# Patient Record
Sex: FEMALE | Race: WHITE | NOT HISPANIC OR LATINO | Employment: FULL TIME | URBAN - NONMETROPOLITAN AREA
[De-identification: names, ages, dates, MRNs, and addresses within clinical notes are randomized per-mention and may not be internally consistent; named-entity substitution may affect disease eponyms.]

---

## 2023-06-28 PROBLEM — M25.531 RIGHT WRIST PAIN: Status: ACTIVE | Noted: 2023-06-28

## 2023-06-29 ENCOUNTER — TELEPHONE (OUTPATIENT)
Dept: PRIMARY CARE | Facility: CLINIC | Age: 63
End: 2023-06-29

## 2023-06-29 ENCOUNTER — OFFICE VISIT (OUTPATIENT)
Dept: PRIMARY CARE | Facility: CLINIC | Age: 63
End: 2023-06-29
Payer: COMMERCIAL

## 2023-06-29 VITALS
SYSTOLIC BLOOD PRESSURE: 116 MMHG | HEART RATE: 74 BPM | HEIGHT: 61 IN | WEIGHT: 140.9 LBS | OXYGEN SATURATION: 98 % | DIASTOLIC BLOOD PRESSURE: 62 MMHG | BODY MASS INDEX: 26.6 KG/M2

## 2023-06-29 DIAGNOSIS — M79.604 LEG PAIN, BILATERAL: Primary | ICD-10-CM

## 2023-06-29 DIAGNOSIS — I73.9 CLAUDICATION (CMS-HCC): ICD-10-CM

## 2023-06-29 DIAGNOSIS — R21 SKIN RASH: ICD-10-CM

## 2023-06-29 DIAGNOSIS — R60.0 EDEMA OF BOTH LOWER LEGS: ICD-10-CM

## 2023-06-29 DIAGNOSIS — M79.605 LEG PAIN, BILATERAL: Primary | ICD-10-CM

## 2023-06-29 PROCEDURE — 99214 OFFICE O/P EST MOD 30 MIN: CPT | Performed by: FAMILY MEDICINE

## 2023-06-29 PROCEDURE — 1036F TOBACCO NON-USER: CPT | Performed by: FAMILY MEDICINE

## 2023-06-29 RX ORDER — MELOXICAM 7.5 MG/1
7.5 TABLET ORAL DAILY
Qty: 30 TABLET | Refills: 11 | Status: SHIPPED | OUTPATIENT
Start: 2023-06-29 | End: 2023-08-04 | Stop reason: SDUPTHER

## 2023-06-29 RX ORDER — EPINEPHRINE 0.3 MG/.3ML
INJECTION SUBCUTANEOUS
COMMUNITY

## 2023-06-29 ASSESSMENT — ENCOUNTER SYMPTOMS
NAIL CHANGES: 0
ARTHRALGIAS: 1
FEVER: 0
PALPITATIONS: 0
RHINORRHEA: 0
DIARRHEA: 0
FATIGUE: 0
ANOREXIA: 0
COUGH: 0
MYALGIAS: 1
SORE THROAT: 0
SHORTNESS OF BREATH: 0
EYE PAIN: 0
VOMITING: 0

## 2023-06-29 ASSESSMENT — PATIENT HEALTH QUESTIONNAIRE - PHQ9
SUM OF ALL RESPONSES TO PHQ9 QUESTIONS 1 AND 2: 0
1. LITTLE INTEREST OR PLEASURE IN DOING THINGS: NOT AT ALL
2. FEELING DOWN, DEPRESSED OR HOPELESS: NOT AT ALL

## 2023-06-29 NOTE — PROGRESS NOTES
Subjective   Patient ID: Bel Adair is a 63 y.o. female who presents for Rash (Legs x4mon).    Rash  This is a recurrent problem. The current episode started more than 1 month ago. The problem has been gradually worsening since onset. The affected locations include the left leg, left ankle, left foot, left toes, right leg, right ankle, right foot and right toes. The rash is characterized by pain, redness, swelling and itchiness. She was exposed to nothing. Associated symptoms include joint pain. Pertinent negatives include no anorexia, congestion, cough, diarrhea, eye pain, facial edema, fatigue, fever, nail changes, rhinorrhea, shortness of breath, sore throat or vomiting.      We had talked in the past about some hand issues could it be rheumatism.  No other issues but now she started to have a little bit of swelling is very minimal but aches and pains in the skin with a little change to like a stasis dermatitis rash on the lower legs.  She is appearing to also have some claudication-like pain when she walks.  She does have some back issues but no obvious neurogenic claudication.    When she uses things like ibuprofen and Naprosyn it does help with the joint pain but not with the soft tissue/phlebitis leg pain.  No extreme fatigue  Basic labs including thyroid and CBC were okay in February.    ?  Claudication.  Ultrasound of the arteries with TRE  Stop Naprosyn and try meloxicam daily  Office visit in a month    Does she need a rheumatism work-up, prednisone trial?  Review of Systems   Constitutional:  Negative for fatigue and fever.   HENT:  Negative for congestion, rhinorrhea and sore throat.    Eyes:  Negative for pain.   Respiratory:  Negative for cough and shortness of breath.    Cardiovascular:  Positive for leg swelling. Negative for chest pain and palpitations.   Gastrointestinal:  Negative for anorexia, diarrhea and vomiting.   Musculoskeletal:  Positive for arthralgias, joint pain and myalgias.  "Negative for gait problem.   Skin:  Positive for rash. Negative for nail changes.       Objective   /62   Pulse 74   Ht 1.549 m (5' 1\")   Wt 63.9 kg (140 lb 14.4 oz)   SpO2 98%   BMI 26.62 kg/m²     Physical Exam  Constitutional:       Appearance: Normal appearance.   HENT:      Head: Normocephalic and atraumatic.   Cardiovascular:      Rate and Rhythm: Normal rate and regular rhythm.      Heart sounds: Normal heart sounds.   Pulmonary:      Effort: Pulmonary effort is normal.      Breath sounds: Normal breath sounds.   Skin:     General: Skin is warm and dry.   Neurological:      General: No focal deficit present.      Mental Status: She is alert and oriented to person, place, and time.      Gait: Gait normal.   Psychiatric:         Mood and Affect: Mood normal.         Behavior: Behavior normal.         Thought Content: Thought content normal.         Judgment: Judgment normal.     Could not feel dorsalis pedis or posterior tibial pulses in either leg.    Assessment/Plan   Problem List Items Addressed This Visit       Leg pain, bilateral - Primary    Relevant Medications    meloxicam (Mobic) 7.5 mg tablet    Other Relevant Orders    Vascular US lower extremity arterial duplex bilateral with TRE    Edema of both lower legs    Relevant Orders    Vascular US lower extremity arterial duplex bilateral with TRE    Skin rash    Relevant Orders    Vascular US lower extremity arterial duplex bilateral with TRE     Other Visit Diagnoses       Claudication (CMS/HCC)        Relevant Orders    Vascular US lower extremity arterial duplex bilateral with TRE               "

## 2023-08-04 ENCOUNTER — LAB (OUTPATIENT)
Dept: LAB | Facility: LAB | Age: 63
End: 2023-08-04
Payer: COMMERCIAL

## 2023-08-04 ENCOUNTER — OFFICE VISIT (OUTPATIENT)
Dept: PRIMARY CARE | Facility: CLINIC | Age: 63
End: 2023-08-04
Payer: COMMERCIAL

## 2023-08-04 VITALS
HEART RATE: 52 BPM | OXYGEN SATURATION: 97 % | BODY MASS INDEX: 26.6 KG/M2 | DIASTOLIC BLOOD PRESSURE: 58 MMHG | HEIGHT: 61 IN | WEIGHT: 140.9 LBS | SYSTOLIC BLOOD PRESSURE: 126 MMHG

## 2023-08-04 DIAGNOSIS — M79.604 LEG PAIN, BILATERAL: ICD-10-CM

## 2023-08-04 DIAGNOSIS — M79.605 LEG PAIN, BILATERAL: Primary | ICD-10-CM

## 2023-08-04 DIAGNOSIS — M79.605 LEG PAIN, BILATERAL: ICD-10-CM

## 2023-08-04 DIAGNOSIS — R53.83 FATIGUE, UNSPECIFIED TYPE: ICD-10-CM

## 2023-08-04 DIAGNOSIS — M25.531 RIGHT WRIST PAIN: ICD-10-CM

## 2023-08-04 DIAGNOSIS — M79.604 LEG PAIN, BILATERAL: Primary | ICD-10-CM

## 2023-08-04 LAB
C REACTIVE PROTEIN (MG/L) IN SER/PLAS: 0.3 MG/DL
ERYTHROCYTE DISTRIBUTION WIDTH (RATIO) BY AUTOMATED COUNT: 12.9 % (ref 11.5–14.5)
ERYTHROCYTE MEAN CORPUSCULAR HEMOGLOBIN CONCENTRATION (G/DL) BY AUTOMATED: 32.8 G/DL (ref 32–36)
ERYTHROCYTE MEAN CORPUSCULAR VOLUME (FL) BY AUTOMATED COUNT: 94 FL (ref 80–100)
ERYTHROCYTES (10*6/UL) IN BLOOD BY AUTOMATED COUNT: 4.18 X10E12/L (ref 4–5.2)
HEMATOCRIT (%) IN BLOOD BY AUTOMATED COUNT: 39.3 % (ref 36–46)
HEMOGLOBIN (G/DL) IN BLOOD: 12.9 G/DL (ref 12–16)
LEUKOCYTES (10*3/UL) IN BLOOD BY AUTOMATED COUNT: 7.8 X10E9/L (ref 4.4–11.3)
PLATELETS (10*3/UL) IN BLOOD AUTOMATED COUNT: 186 X10E9/L (ref 150–450)
SEDIMENTATION RATE, ERYTHROCYTE: 8 MM/H (ref 0–30)

## 2023-08-04 PROCEDURE — 86140 C-REACTIVE PROTEIN: CPT

## 2023-08-04 PROCEDURE — 99214 OFFICE O/P EST MOD 30 MIN: CPT | Performed by: FAMILY MEDICINE

## 2023-08-04 PROCEDURE — 86038 ANTINUCLEAR ANTIBODIES: CPT

## 2023-08-04 PROCEDURE — 1036F TOBACCO NON-USER: CPT | Performed by: FAMILY MEDICINE

## 2023-08-04 PROCEDURE — 86431 RHEUMATOID FACTOR QUANT: CPT

## 2023-08-04 PROCEDURE — 85652 RBC SED RATE AUTOMATED: CPT

## 2023-08-04 PROCEDURE — 86200 CCP ANTIBODY: CPT

## 2023-08-04 PROCEDURE — 36415 COLL VENOUS BLD VENIPUNCTURE: CPT

## 2023-08-04 PROCEDURE — 85027 COMPLETE CBC AUTOMATED: CPT

## 2023-08-04 RX ORDER — PREDNISONE 10 MG/1
TABLET ORAL
Qty: 30 TABLET | Refills: 1 | Status: SHIPPED | OUTPATIENT
Start: 2023-08-04 | End: 2023-08-16

## 2023-08-04 RX ORDER — MELOXICAM 15 MG/1
15 TABLET ORAL DAILY
Qty: 30 TABLET | Refills: 11 | Status: SHIPPED | OUTPATIENT
Start: 2023-08-04 | End: 2024-08-03

## 2023-08-04 ASSESSMENT — PATIENT HEALTH QUESTIONNAIRE - PHQ9
SUM OF ALL RESPONSES TO PHQ9 QUESTIONS 1 AND 2: 0
2. FEELING DOWN, DEPRESSED OR HOPELESS: NOT AT ALL
1. LITTLE INTEREST OR PLEASURE IN DOING THINGS: NOT AT ALL

## 2023-08-04 ASSESSMENT — ENCOUNTER SYMPTOMS
FATIGUE: 1
DIARRHEA: 0
HEADACHES: 0
PALPITATIONS: 0
NUMBNESS: 1
CONSTIPATION: 0
ARTHRALGIAS: 1
ABDOMINAL PAIN: 0
CHEST TIGHTNESS: 0
COUGH: 0
SHORTNESS OF BREATH: 0
WEAKNESS: 1
MYALGIAS: 1

## 2023-08-04 NOTE — PROGRESS NOTES
"Subjective   Patient ID: Bel Adair is a 63 y.o. female who presents for Follow-up.    HPI   The PVR test for circulation came back normal for both legs.  The hand pain knee pain and leg pain and foot pain did get better with the meloxicam.  But when she tried to do a lot of walking had a trip recently she felt like she had weakness more than she had in the legs and the leg pain got much worse.  At this point I am suspecting autoimmune.    Lupus rheumatism work-up for labs  Increase meloxicam to 15 mg daily  Prednisone taper    No office visit set yet.  Likely start with rheumatology referral.?  Neurology referral.  Review of Systems   Constitutional:  Positive for fatigue.   Eyes:  Negative for visual disturbance.   Respiratory:  Negative for cough, chest tightness and shortness of breath.    Cardiovascular:  Negative for chest pain and palpitations.   Gastrointestinal:  Negative for abdominal pain, constipation and diarrhea.   Musculoskeletal:  Positive for arthralgias and myalgias.   Skin:  Negative for rash.   Neurological:  Positive for weakness and numbness. Negative for headaches.       Objective   /58   Pulse 52   Ht 1.549 m (5' 1\")   Wt 63.9 kg (140 lb 14.4 oz)   SpO2 97%   BMI 26.62 kg/m²     Physical Exam  Constitutional:       Appearance: Normal appearance.   HENT:      Head: Normocephalic and atraumatic.   Skin:     General: Skin is warm and dry.   Neurological:      General: No focal deficit present.      Mental Status: She is alert and oriented to person, place, and time.      Gait: Gait normal.   Psychiatric:         Mood and Affect: Mood normal.         Behavior: Behavior normal.         Thought Content: Thought content normal.         Judgment: Judgment normal.         Assessment/Plan   Problem List Items Addressed This Visit       Right wrist pain    Relevant Orders    CBC    Comprehensive Metabolic Panel    Rheumatoid Factor    Citrulline Antibody, IgG    Sedimentation Rate    " C-Reactive Protein    REBECCA with Reflex to RUBEN    Leg pain, bilateral - Primary    Relevant Medications    predniSONE (Deltasone) 10 mg tablet    meloxicam (Mobic) 15 mg tablet    Other Relevant Orders    CBC    Comprehensive Metabolic Panel    Rheumatoid Factor    Citrulline Antibody, IgG    Sedimentation Rate    C-Reactive Protein    REBECCA with Reflex to RUBEN     Other Visit Diagnoses       Fatigue, unspecified type        Relevant Orders    CBC    Comprehensive Metabolic Panel    Rheumatoid Factor    Citrulline Antibody, IgG    Sedimentation Rate    C-Reactive Protein    REBECCA with Reflex to RUBEN

## 2023-08-05 LAB
CITRULLINE ANTIBODY, IGG: <1 U/ML
RHEUMATOID FACTOR (IU/ML) IN SERUM OR PLASMA: <10 IU/ML (ref 0–15)

## 2023-08-07 LAB — ANTI-NUCLEAR ANTIBODY (ANA): NEGATIVE

## 2023-11-21 ENCOUNTER — APPOINTMENT (OUTPATIENT)
Dept: URBAN - METROPOLITAN AREA CLINIC 204 | Age: 63
Setting detail: DERMATOLOGY
End: 2023-11-22

## 2023-11-21 PROCEDURE — 99203 OFFICE O/P NEW LOW 30 MIN: CPT | Mod: 25

## 2023-11-21 PROCEDURE — OTHER MIPS QUALITY: OTHER

## 2023-11-21 PROCEDURE — 17000 DESTRUCT PREMALG LESION: CPT | Mod: 59

## 2023-11-21 PROCEDURE — 17003 DESTRUCT PREMALG LES 2-14: CPT | Mod: 59

## 2023-11-21 PROCEDURE — 17110 DESTRUCT B9 LESION 1-14: CPT

## 2024-08-27 DIAGNOSIS — M19.90 ARTHRITIS: ICD-10-CM

## 2024-08-27 RX ORDER — MELOXICAM 15 MG/1
15 TABLET ORAL DAILY
Qty: 30 TABLET | Refills: 11 | Status: SHIPPED | OUTPATIENT
Start: 2024-08-27 | End: 2025-08-27

## 2024-09-10 ENCOUNTER — APPOINTMENT (OUTPATIENT)
Age: 64
End: 2024-09-10
Payer: COMMERCIAL

## 2024-09-10 VITALS
HEIGHT: 61 IN | BODY MASS INDEX: 27.45 KG/M2 | SYSTOLIC BLOOD PRESSURE: 116 MMHG | OXYGEN SATURATION: 98 % | DIASTOLIC BLOOD PRESSURE: 72 MMHG | WEIGHT: 145.4 LBS | HEART RATE: 61 BPM

## 2024-09-10 DIAGNOSIS — R53.83 FATIGUE, UNSPECIFIED TYPE: ICD-10-CM

## 2024-09-10 DIAGNOSIS — Z00.00 ROUTINE GENERAL MEDICAL EXAMINATION AT A HEALTH CARE FACILITY: Primary | ICD-10-CM

## 2024-09-10 PROBLEM — R60.0 EDEMA OF BOTH LOWER LEGS: Status: RESOLVED | Noted: 2023-06-29 | Resolved: 2024-09-10

## 2024-09-10 PROBLEM — M25.531 RIGHT WRIST PAIN: Status: RESOLVED | Noted: 2023-06-28 | Resolved: 2024-09-10

## 2024-09-10 PROBLEM — G25.81 RESTLESS LEGS SYNDROME: Status: ACTIVE | Noted: 2024-09-10

## 2024-09-10 PROCEDURE — 99396 PREV VISIT EST AGE 40-64: CPT | Performed by: FAMILY MEDICINE

## 2024-09-10 PROCEDURE — 3008F BODY MASS INDEX DOCD: CPT | Performed by: FAMILY MEDICINE

## 2024-09-10 PROCEDURE — 1036F TOBACCO NON-USER: CPT | Performed by: FAMILY MEDICINE

## 2024-09-10 RX ORDER — AZELASTINE 1 MG/ML
1 SPRAY, METERED NASAL 2 TIMES DAILY
COMMUNITY

## 2024-09-10 ASSESSMENT — ENCOUNTER SYMPTOMS
ARTHRALGIAS: 1
BLOOD IN STOOL: 0
SHORTNESS OF BREATH: 0
PALPITATIONS: 0
DIARRHEA: 0
COUGH: 0
CONSTIPATION: 0
FATIGUE: 1

## 2024-09-10 NOTE — PROGRESS NOTES
"Subjective   Patient ID: Bel Adair is a 64 y.o. female who presents for Med Refill.    [facility]   Cologuard 2023  She did get benefit with both the skin and the joint pains with the prednisone.  For the most part the meloxicam is helping control the joint pain.  She is getting a couple of slightly raised lesions that are a little bit tender 1 in the hands and some on the legs.  She is also been seeing the eye doctor for sicca syndrome dry eyes dry mouth.  Rheumatism test last year were negative but I still think something autoimmune is going on.  Possible early psoriatic arthritis  She is also been having some of the reflux issue and occasional chest pain at rest but sounds like esophageal.  She has had no change in her exercise tolerance today but pulling down a porch moving things without any chest pains chest pressures.  She has been walking she has had increased exercise tolerance throughout the summer.  All the chest pains have occurred at rest.    Also she feels like her restless legs is getting worse not on any medications for that.    I think she needs to see rheumatology and dermatology.  Also some chronic pain from frostbite.?  Dermatology versus rheumatology    Recommend mammogram    Start with blood test test this week.  Office visit 1 month  Review of Systems   Constitutional:  Positive for fatigue.   Eyes:  Negative for visual disturbance.   Respiratory:  Negative for cough and shortness of breath.    Cardiovascular:  Negative for chest pain and palpitations.   Gastrointestinal:  Negative for blood in stool, constipation and diarrhea.   Musculoskeletal:  Positive for arthralgias.   Skin:  Positive for rash.       Objective   /72   Pulse 61   Ht 1.549 m (5' 1\")   Wt 66 kg (145 lb 6.4 oz)   SpO2 98%   BMI 27.47 kg/m²     Physical Exam  Constitutional:       Appearance: Normal appearance.   HENT:      Head: Normocephalic and atraumatic.   Cardiovascular:      Rate and Rhythm: Normal rate and " regular rhythm.      Heart sounds: Normal heart sounds.   Pulmonary:      Effort: Pulmonary effort is normal.      Breath sounds: Normal breath sounds.   Skin:     General: Skin is warm and dry.   Neurological:      General: No focal deficit present.      Mental Status: She is alert and oriented to person, place, and time.   Psychiatric:         Mood and Affect: Mood normal.         Behavior: Behavior normal.         Thought Content: Thought content normal.         Judgment: Judgment normal.         Assessment/Plan   Problem List Items Addressed This Visit    None  Visit Diagnoses         Codes    Routine general medical examination at a health care facility    -  Primary Z00.00    Relevant Orders    CBC    Comprehensive Metabolic Panel    Lipid Panel    Thyroid Stimulating Hormone    Magnesium    Vitamin B12    Fatigue, unspecified type     R53.83    Relevant Orders    CBC

## 2024-09-11 ENCOUNTER — LAB (OUTPATIENT)
Facility: LAB | Age: 64
End: 2024-09-11
Payer: COMMERCIAL

## 2024-09-11 DIAGNOSIS — Z00.00 ROUTINE GENERAL MEDICAL EXAMINATION AT A HEALTH CARE FACILITY: ICD-10-CM

## 2024-09-11 DIAGNOSIS — R53.83 FATIGUE, UNSPECIFIED TYPE: ICD-10-CM

## 2024-09-11 LAB
ALBUMIN SERPL BCP-MCNC: 4.3 G/DL (ref 3.4–5)
ALP SERPL-CCNC: 57 U/L (ref 33–136)
ALT SERPL W P-5'-P-CCNC: 18 U/L (ref 7–45)
ANION GAP SERPL CALC-SCNC: 11 MMOL/L (ref 10–20)
AST SERPL W P-5'-P-CCNC: 19 U/L (ref 9–39)
BILIRUB SERPL-MCNC: 0.4 MG/DL (ref 0–1.2)
BUN SERPL-MCNC: 18 MG/DL (ref 6–23)
CALCIUM SERPL-MCNC: 9.3 MG/DL (ref 8.6–10.3)
CHLORIDE SERPL-SCNC: 105 MMOL/L (ref 98–107)
CHOLEST SERPL-MCNC: 247 MG/DL (ref 0–199)
CHOLESTEROL/HDL RATIO: 4.8
CO2 SERPL-SCNC: 30 MMOL/L (ref 21–32)
CREAT SERPL-MCNC: 0.81 MG/DL (ref 0.5–1.05)
EGFRCR SERPLBLD CKD-EPI 2021: 82 ML/MIN/1.73M*2
ERYTHROCYTE [DISTWIDTH] IN BLOOD BY AUTOMATED COUNT: 12.7 % (ref 11.5–14.5)
GLUCOSE SERPL-MCNC: 95 MG/DL (ref 74–99)
HCT VFR BLD AUTO: 37.9 % (ref 36–46)
HDLC SERPL-MCNC: 52 MG/DL
HGB BLD-MCNC: 12.4 G/DL (ref 12–16)
LDLC SERPL CALC-MCNC: 169 MG/DL
MAGNESIUM SERPL-MCNC: 2.23 MG/DL (ref 1.6–2.4)
MCH RBC QN AUTO: 31.1 PG (ref 26–34)
MCHC RBC AUTO-ENTMCNC: 32.7 G/DL (ref 32–36)
MCV RBC AUTO: 95 FL (ref 80–100)
NON HDL CHOLESTEROL: 195 MG/DL (ref 0–149)
NRBC BLD-RTO: 0 /100 WBCS (ref 0–0)
PLATELET # BLD AUTO: 184 X10*3/UL (ref 150–450)
POTASSIUM SERPL-SCNC: 4.5 MMOL/L (ref 3.5–5.3)
PROT SERPL-MCNC: 6.5 G/DL (ref 6.4–8.2)
RBC # BLD AUTO: 3.99 X10*6/UL (ref 4–5.2)
SODIUM SERPL-SCNC: 141 MMOL/L (ref 136–145)
TRIGL SERPL-MCNC: 131 MG/DL (ref 0–149)
TSH SERPL-ACNC: 1.79 MIU/L (ref 0.44–3.98)
VIT B12 SERPL-MCNC: 359 PG/ML (ref 211–911)
VLDL: 26 MG/DL (ref 0–40)
WBC # BLD AUTO: 5.3 X10*3/UL (ref 4.4–11.3)

## 2024-09-11 PROCEDURE — 36415 COLL VENOUS BLD VENIPUNCTURE: CPT

## 2024-10-08 ENCOUNTER — APPOINTMENT (OUTPATIENT)
Age: 64
End: 2024-10-08
Payer: COMMERCIAL

## 2024-10-08 VITALS
WEIGHT: 146.4 LBS | HEART RATE: 66 BPM | DIASTOLIC BLOOD PRESSURE: 82 MMHG | SYSTOLIC BLOOD PRESSURE: 122 MMHG | BODY MASS INDEX: 27.64 KG/M2 | HEIGHT: 61 IN | OXYGEN SATURATION: 99 %

## 2024-10-08 DIAGNOSIS — R21 SKIN RASH: Primary | ICD-10-CM

## 2024-10-08 PROCEDURE — 1036F TOBACCO NON-USER: CPT | Performed by: FAMILY MEDICINE

## 2024-10-08 PROCEDURE — 3008F BODY MASS INDEX DOCD: CPT | Performed by: FAMILY MEDICINE

## 2024-10-08 PROCEDURE — 99213 OFFICE O/P EST LOW 20 MIN: CPT | Performed by: FAMILY MEDICINE

## 2024-10-08 RX ORDER — PREDNISONE 10 MG/1
TABLET ORAL
Qty: 30 TABLET | Refills: 2 | Status: SHIPPED | OUTPATIENT
Start: 2024-10-08 | End: 2024-10-20

## 2024-10-08 RX ORDER — TRIAMCINOLONE ACETONIDE 5 MG/G
CREAM TOPICAL 2 TIMES DAILY PRN
Qty: 30 G | Refills: 1 | Status: SHIPPED | OUTPATIENT
Start: 2024-10-08

## 2024-10-08 ASSESSMENT — ENCOUNTER SYMPTOMS
ARTHRALGIAS: 1
MYALGIAS: 1

## 2024-10-08 ASSESSMENT — PATIENT HEALTH QUESTIONNAIRE - PHQ9
2. FEELING DOWN, DEPRESSED OR HOPELESS: NOT AT ALL
SUM OF ALL RESPONSES TO PHQ9 QUESTIONS 1 AND 2: 0
1. LITTLE INTEREST OR PLEASURE IN DOING THINGS: NOT AT ALL

## 2024-10-08 NOTE — PROGRESS NOTES
"Subjective   Patient ID: Bel Adair is a 64 y.o. female who presents for Follow-up (1 mo fu for pain ).    HPI   Also comments that she might of had some frostbite in the past.  Does have troubles during the wintertime with sounds like peripheral neuropathy.  But it is mild.  Still has the restless legs, it comes and goes.  Has been using castor oil from a [employer] store for her skin.  Rheumatology labs from last year were okay.  Basic labs this summer were good also.   Will use some trying assistance and alone cream as needed.  Still only use it once a day most of the time a little bit goes a long way.  Also have a backup prednisone prescription.  Please she will need to do a 4321 taper.  She can take it a few days here there can figure out the dose  Continue meloxicam    In the end I think she probably does need to see rheumatology but with her situation she is going to try to see if she can wait till 67.  Point things are mild and I think that is reasonable.    Review of Systems   Musculoskeletal:  Positive for arthralgias and myalgias.   Skin:  Positive for rash.       Objective   /82   Pulse 66   Ht 1.549 m (5' 1\")   Wt 66.4 kg (146 lb 6.4 oz)   SpO2 99%   BMI 27.66 kg/m²     Physical Exam  Constitutional:       Appearance: Normal appearance.   HENT:      Head: Normocephalic and atraumatic.   Skin:     General: Skin is warm and dry.   Neurological:      General: No focal deficit present.      Mental Status: She is alert and oriented to person, place, and time.   Psychiatric:         Mood and Affect: Mood normal.         Behavior: Behavior normal.         Thought Content: Thought content normal.         Judgment: Judgment normal.         Assessment/Plan   Problem List Items Addressed This Visit             ICD-10-CM    Skin rash - Primary R21    Relevant Medications    triamcinolone (Kenalog) 0.5 % cream    predniSONE (Deltasone) 10 mg tablet          "

## 2025-04-08 ENCOUNTER — APPOINTMENT (OUTPATIENT)
Age: 65
End: 2025-04-08
Payer: COMMERCIAL

## 2025-07-15 ENCOUNTER — APPOINTMENT (OUTPATIENT)
Age: 65
End: 2025-07-15
Payer: COMMERCIAL

## 2025-07-22 ENCOUNTER — TELEPHONE (OUTPATIENT)
Age: 65
End: 2025-07-22

## 2025-07-22 ENCOUNTER — APPOINTMENT (OUTPATIENT)
Age: 65
End: 2025-07-22
Payer: COMMERCIAL

## 2025-07-22 VITALS
HEIGHT: 61 IN | HEART RATE: 62 BPM | SYSTOLIC BLOOD PRESSURE: 120 MMHG | WEIGHT: 148.4 LBS | OXYGEN SATURATION: 100 % | BODY MASS INDEX: 28.02 KG/M2 | DIASTOLIC BLOOD PRESSURE: 72 MMHG

## 2025-07-22 DIAGNOSIS — M79.605 LEG PAIN, BILATERAL: Primary | ICD-10-CM

## 2025-07-22 DIAGNOSIS — R20.0 NUMBNESS AND TINGLING: ICD-10-CM

## 2025-07-22 DIAGNOSIS — Z13.220 LIPID SCREENING: ICD-10-CM

## 2025-07-22 DIAGNOSIS — R53.83 FATIGUE, UNSPECIFIED TYPE: ICD-10-CM

## 2025-07-22 DIAGNOSIS — M79.604 LEG PAIN, BILATERAL: Primary | ICD-10-CM

## 2025-07-22 DIAGNOSIS — G25.81 RESTLESS LEGS SYNDROME: ICD-10-CM

## 2025-07-22 DIAGNOSIS — R20.2 NUMBNESS AND TINGLING: ICD-10-CM

## 2025-07-22 PROCEDURE — 3008F BODY MASS INDEX DOCD: CPT | Performed by: FAMILY MEDICINE

## 2025-07-22 PROCEDURE — 99214 OFFICE O/P EST MOD 30 MIN: CPT | Performed by: FAMILY MEDICINE

## 2025-07-22 PROCEDURE — 1036F TOBACCO NON-USER: CPT | Performed by: FAMILY MEDICINE

## 2025-07-22 RX ORDER — MELOXICAM 15 MG/1
15 TABLET ORAL DAILY
Qty: 30 TABLET | Refills: 11 | Status: CANCELLED | OUTPATIENT
Start: 2025-07-22 | End: 2026-07-22

## 2025-07-22 RX ORDER — CELECOXIB 200 MG/1
200 CAPSULE ORAL 2 TIMES DAILY
Qty: 60 CAPSULE | Refills: 11 | Status: SHIPPED | OUTPATIENT
Start: 2025-07-22 | End: 2026-07-22

## 2025-07-22 ASSESSMENT — ENCOUNTER SYMPTOMS
SHORTNESS OF BREATH: 0
CONSTIPATION: 0
LOSS OF SENSATION IN FEET: 0
ARTHRALGIAS: 1
DEPRESSION: 0
LIGHT-HEADEDNESS: 0
MYALGIAS: 1
FATIGUE: 1
DIARRHEA: 0
OCCASIONAL FEELINGS OF UNSTEADINESS: 0
PALPITATIONS: 0
HEADACHES: 0

## 2025-07-22 NOTE — PROGRESS NOTES
"Subjective   Patient ID: Bel Adair is a 65 y.o. female who presents for Follow-up (Med rf on meloxicam).    HPI   Regular follow-up for the aches and pains in the restless legs.  He notices she has a lot of refined sugar that definitely makes sleep worse with the restless legs.  Things are improved with magnesium taking roughly 200 mg per.  Does take a multivitamin but just periodically.  No big changes when she uses it overnight.  Does seem to have troubles if she takes too much zinc.  Also still has the numbness.  Is only taking very low doses of B12.  And a B complex.    Increase the magnesium, likely magnesium oxide to 40 mg daily  Start B12 over-the-counter 1000 mcg 1 pill daily  Periodic MVI.  She is also wondering if she can get more meloxicam.  Will stop meloxicam and try Celebrex.  Start once a day with the option of going to twice a day.  She did not get relief from ibuprofen or Aleve.?  Relafen if needed    Will recheck labs today  Also wants to do recheck of lipids.    Review of Systems   Constitutional:  Positive for fatigue.   Respiratory:  Negative for shortness of breath.    Cardiovascular:  Negative for palpitations.   Gastrointestinal:  Negative for constipation and diarrhea.   Endocrine: Negative for cold intolerance and heat intolerance.   Musculoskeletal:  Positive for arthralgias and myalgias.   Neurological:  Negative for light-headedness and headaches.       Objective   /72   Pulse 62   Ht (!) 1.549 m (5' 1\")   Wt 67.3 kg (148 lb 6.4 oz)   SpO2 100%   BMI 28.04 kg/m²     Physical Exam  Constitutional:       Appearance: Normal appearance.   HENT:      Head: Normocephalic and atraumatic.     Cardiovascular:      Rate and Rhythm: Normal rate and regular rhythm.      Heart sounds: Normal heart sounds.   Pulmonary:      Effort: Pulmonary effort is normal.      Breath sounds: Normal breath sounds.     Skin:     General: Skin is warm and dry.     Neurological:      General: No focal " deficit present.      Mental Status: She is alert and oriented to person, place, and time.     Psychiatric:         Mood and Affect: Mood normal.         Behavior: Behavior normal.         Thought Content: Thought content normal.         Judgment: Judgment normal.         Assessment/Plan   Problem List Items Addressed This Visit           ICD-10-CM    Leg pain, bilateral - Primary M79.604, M79.605    Relevant Medications    celecoxib (CeleBREX) 200 mg capsule    Restless legs syndrome G25.81    Relevant Orders    CBC    Comprehensive Metabolic Panel    Magnesium    Vitamin B12    Thyroid Stimulating Hormone    Numbness and tingling R20.0, R20.2    Relevant Orders    Comprehensive Metabolic Panel    Magnesium    Vitamin B12     Other Visit Diagnoses         Codes      Fatigue, unspecified type     R53.83    Relevant Orders    CBC    Comprehensive Metabolic Panel    Magnesium    Thyroid Stimulating Hormone      Lipid screening     Z13.220    Relevant Orders    Lipid Panel

## 2025-07-22 NOTE — TELEPHONE ENCOUNTER
PHARMACY NOTIFIED PATIENT WILL BE TAKING ONLY CELECOXIB. DR. RAO IS STOPPING THE MELOXICAM.     PHARMACY CALLED TO CLARIFY IF PATIENT IS TO BE TAKING CELECOXIB AND MELOXICAM

## 2025-07-23 LAB
ALBUMIN SERPL-MCNC: 4.7 G/DL (ref 3.6–5.1)
ALP SERPL-CCNC: 60 U/L (ref 37–153)
ALT SERPL-CCNC: 17 U/L (ref 6–29)
ANION GAP SERPL CALCULATED.4IONS-SCNC: 8 MMOL/L (CALC) (ref 7–17)
AST SERPL-CCNC: 20 U/L (ref 10–35)
BILIRUB SERPL-MCNC: 0.4 MG/DL (ref 0.2–1.2)
BUN SERPL-MCNC: 18 MG/DL (ref 7–25)
CALCIUM SERPL-MCNC: 9.7 MG/DL (ref 8.6–10.4)
CHLORIDE SERPL-SCNC: 103 MMOL/L (ref 98–110)
CHOLEST SERPL-MCNC: 257 MG/DL
CHOLEST/HDLC SERPL: 4.5 (CALC)
CO2 SERPL-SCNC: 28 MMOL/L (ref 20–32)
CREAT SERPL-MCNC: 0.76 MG/DL (ref 0.5–1.05)
EGFRCR SERPLBLD CKD-EPI 2021: 88 ML/MIN/1.73M2
ERYTHROCYTE [DISTWIDTH] IN BLOOD BY AUTOMATED COUNT: 13.4 % (ref 11–15)
GLUCOSE SERPL-MCNC: 99 MG/DL (ref 65–99)
HCT VFR BLD AUTO: 38.8 % (ref 35–45)
HDLC SERPL-MCNC: 57 MG/DL
HGB BLD-MCNC: 12.5 G/DL (ref 11.7–15.5)
LDLC SERPL CALC-MCNC: 169 MG/DL (CALC)
MAGNESIUM SERPL-MCNC: 2.4 MG/DL (ref 1.5–2.5)
MCH RBC QN AUTO: 30.6 PG (ref 27–33)
MCHC RBC AUTO-ENTMCNC: 32.2 G/DL (ref 32–36)
MCV RBC AUTO: 95.1 FL (ref 80–100)
NONHDLC SERPL-MCNC: 200 MG/DL (CALC)
PLATELET # BLD AUTO: 174 THOUSAND/UL (ref 140–400)
PMV BLD REES-ECKER: 11.8 FL (ref 7.5–12.5)
POTASSIUM SERPL-SCNC: 4.2 MMOL/L (ref 3.5–5.3)
PROT SERPL-MCNC: 7.2 G/DL (ref 6.1–8.1)
RBC # BLD AUTO: 4.08 MILLION/UL (ref 3.8–5.1)
SODIUM SERPL-SCNC: 139 MMOL/L (ref 135–146)
TRIGL SERPL-MCNC: 165 MG/DL
TSH SERPL-ACNC: 1.6 MIU/L (ref 0.4–4.5)
VIT B12 SERPL-MCNC: 572 PG/ML (ref 200–1100)
WBC # BLD AUTO: 6 THOUSAND/UL (ref 3.8–10.8)

## 2025-08-08 DIAGNOSIS — M19.90 ARTHRITIS: ICD-10-CM

## 2025-08-08 RX ORDER — MELOXICAM 15 MG/1
15 TABLET ORAL DAILY
Qty: 30 TABLET | Refills: 11 | Status: SHIPPED | OUTPATIENT
Start: 2025-08-08 | End: 2026-08-08

## 2025-08-08 NOTE — TELEPHONE ENCOUNTER
PATIENT CALLED TO REPORT SHE WOULD RATHER TAKE THE MELOXICAM 15 MG DAILY INSTEAD OF CELECOXIB, 200 MG BID. WILL NEED REFILL ON MELOXICAM.